# Patient Record
Sex: FEMALE
[De-identification: names, ages, dates, MRNs, and addresses within clinical notes are randomized per-mention and may not be internally consistent; named-entity substitution may affect disease eponyms.]

---

## 2019-07-18 ENCOUNTER — HOSPITAL ENCOUNTER (OUTPATIENT)
Dept: HOSPITAL 53 - M SMT | Age: 29
End: 2019-07-18
Attending: INTERNAL MEDICINE

## 2019-07-18 DIAGNOSIS — Z02.71: Primary | ICD-10-CM

## 2019-07-18 NOTE — REP
Lumbar spine three views:

There are no comparisons.

Vertebral body heights, interspacing alignment are normal.

There is mild scoliosis convex left, possibly positional.

There is no spondylolysis or spondylolisthesis.

The pedicles, facets and sacroiliac articulations are unremarkable.

There is a [place] filter.

There is a surgical staple line in the abdomen on the

Impression:

Mild scoliosis, otherwise, negative lumbar spine.

 

 

Electronically Signed by

Juan Pablo Bernard MD 07/18/2019 01:58 P

## 2019-07-18 NOTE — REP
Cervical spine three views AP and lateral projections:

There are no comparisons.

Vertebral body heights and alignment are normal.

There is mild degenerative disc disease at C6-7 and C7-T1.  The disc spaces are

otherwise unremarkable.

The facets are normally aligned and otherwise unremarkable.

The prevertebral soft tissues are normal.

Impression:

Mild degenerative disc disease at C6-7 and see 71.

 

 

Electronically Signed by

Juan Pablo Bernard MD 07/18/2019 02:15 P